# Patient Record
Sex: FEMALE | Race: BLACK OR AFRICAN AMERICAN
[De-identification: names, ages, dates, MRNs, and addresses within clinical notes are randomized per-mention and may not be internally consistent; named-entity substitution may affect disease eponyms.]

---

## 2019-02-04 ENCOUNTER — HOSPITAL ENCOUNTER (EMERGENCY)
Dept: HOSPITAL 74 - JERFT | Age: 29
Discharge: HOME | End: 2019-02-04
Payer: COMMERCIAL

## 2019-08-06 ENCOUNTER — HOSPITAL ENCOUNTER (INPATIENT)
Dept: HOSPITAL 74 - JDEL | Age: 29
LOS: 3 days | Discharge: HOME | DRG: 560 | End: 2019-08-09
Attending: OBSTETRICS & GYNECOLOGY | Admitting: OBSTETRICS & GYNECOLOGY
Payer: COMMERCIAL

## 2019-08-06 VITALS — BODY MASS INDEX: 56 KG/M2

## 2019-08-06 DIAGNOSIS — E66.01: ICD-10-CM

## 2019-08-06 DIAGNOSIS — Z3A.36: ICD-10-CM

## 2019-08-07 LAB
ALBUMIN SERPL-MCNC: 3.3 G/DL (ref 3.4–5)
ALP SERPL-CCNC: 147 U/L (ref 45–117)
ALT SERPL-CCNC: 16 U/L (ref 13–61)
AMPHET UR-MCNC: NEGATIVE NG/ML
ANION GAP SERPL CALC-SCNC: 13 MMOL/L (ref 8–16)
APPEARANCE UR: CLEAR
AST SERPL-CCNC: 14 U/L (ref 15–37)
BACTERIA # UR AUTO: 105.9 /HPF
BARBITURATES UR-MCNC: NEGATIVE NG/ML
BASOPHILS # BLD: 0.2 % (ref 0–2)
BENZODIAZ UR SCN-MCNC: NEGATIVE NG/ML
BILIRUB SERPL-MCNC: 0.4 MG/DL (ref 0.2–1)
BILIRUB UR STRIP.AUTO-MCNC: NEGATIVE MG/DL
BUN SERPL-MCNC: 5.7 MG/DL (ref 7–18)
CALCIUM SERPL-MCNC: 9 MG/DL (ref 8.5–10.1)
CASTS URNS QL MICRO: 77 /LPF (ref 0–8)
CHLORIDE SERPL-SCNC: 105 MMOL/L (ref 98–107)
CO2 SERPL-SCNC: 21 MMOL/L (ref 21–32)
COCAINE UR-MCNC: NEGATIVE NG/ML
COLOR UR: YELLOW
CREAT SERPL-MCNC: 0.6 MG/DL (ref 0.55–1.3)
DEPRECATED RDW RBC AUTO: 18 % (ref 11.6–15.6)
EOSINOPHIL # BLD: 0 % (ref 0–4.5)
EPITH CASTS URNS QL MICRO: 8.3 /HPF
GLUCOSE SERPL-MCNC: 122 MG/DL (ref 74–106)
HCT VFR BLD CALC: 34.1 % (ref 32.4–45.2)
HGB BLD-MCNC: 11.9 GM/DL (ref 10.7–15.3)
INR BLD: 1.03 (ref 0.83–1.09)
KETONES UR QL STRIP: (no result)
LEUKOCYTE ESTERASE UR QL STRIP.AUTO: (no result)
LYMPHOCYTES # BLD: 11.8 % (ref 8–40)
MCH RBC QN AUTO: 25.3 PG (ref 25.7–33.7)
MCHC RBC AUTO-ENTMCNC: 34.9 G/DL (ref 32–36)
MCV RBC: 72.4 FL (ref 80–96)
METHADONE UR-MCNC: NEGATIVE NG/ML
MONOCYTES # BLD AUTO: 4.2 % (ref 3.8–10.2)
NEUTROPHILS # BLD: 83.8 % (ref 42.8–82.8)
NITRITE UR QL STRIP: NEGATIVE
OPIATES UR QL SCN: NEGATIVE NG/ML
PCP UR QL SCN: NEGATIVE NG/ML
PH UR: 6.5 [PH] (ref 5–8)
PLATELET # BLD AUTO: 231 K/MM3 (ref 134–434)
PMV BLD: 7.6 FL (ref 7.5–11.1)
POTASSIUM SERPLBLD-SCNC: 3.7 MMOL/L (ref 3.5–5.1)
PROT SERPL-MCNC: 7 G/DL (ref 6.4–8.2)
PROT UR QL STRIP: (no result)
PROT UR QL STRIP: NEGATIVE
PT PNL PPP: 12.1 SEC (ref 9.7–13)
RBC # BLD AUTO: 18 /HPF (ref 0–4)
RBC # BLD AUTO: 4.71 M/MM3 (ref 3.6–5.2)
SODIUM SERPL-SCNC: 139 MMOL/L (ref 136–145)
SP GR UR: 1.03 (ref 1.01–1.03)
UROBILINOGEN UR STRIP-MCNC: 1 MG/DL (ref 0.2–1)
WBC # BLD AUTO: 10.4 K/MM3 (ref 4–10)
WBC # UR AUTO: 3 /HPF (ref 0–5)

## 2019-08-07 PROCEDURE — 0KQM0ZZ REPAIR PERINEUM MUSCLE, OPEN APPROACH: ICD-10-PCS | Performed by: OBSTETRICS & GYNECOLOGY

## 2019-08-07 RX ADMIN — FERROUS SULFATE TAB EC 324 MG (65 MG FE EQUIVALENT) SCH MG: 324 (65 FE) TABLET DELAYED RESPONSE at 09:57

## 2019-08-07 RX ADMIN — SODIUM CHLORIDE, SODIUM LACTATE, POTASSIUM CHLORIDE, CALCIUM CHLORIDE AND DEXTROSE MONOHYDRATE SCH MLS/HR: 5; 600; 310; 30; 20 INJECTION, SOLUTION INTRAVENOUS at 00:30

## 2019-08-07 RX ADMIN — FERROUS SULFATE TAB EC 324 MG (65 MG FE EQUIVALENT) SCH MG: 324 (65 FE) TABLET DELAYED RESPONSE at 21:05

## 2019-08-07 RX ADMIN — Medication SCH TAB: at 09:57

## 2019-08-07 RX ADMIN — Medication SCH MLS/HR: at 01:20

## 2019-08-07 NOTE — HP
Past Medical History





- Primary Care Physician


PCP:: Vemla Campbell





- Admission


Chief Complaint: 28 yrs , 36.4 weeks brought by ambulence in active 

labor.  onset LP  strong since 5.00 PM ? srom at the same time .  PNC at [facility]


History of Present Illness: 


pt started PNC with PMD in [place], transferred to [facility] in [place], , Transferred to 

[facility] due to High Risk Pregn due to High BMI 


pt was seen in L&D on 19 at noon evaluated for labor , 1cm dilated , no 

mention of membranes , ultrasound bpp done was sent home .


US report 19 : SLIUP VX 36.4 wks, STACI 9.8, post placenta, EFW 6'5" , BPP  


we attempted to acquire chart from [facility], No response obtained 


Pt had her visit record where labs were hand written  .


Late registrant in May x 3 visits 


3/10/19 pap NILM 


19 Prenatal panel : B Pos, HBS Ag - neg, Hep c -nr. Rpr -nr, Rubella -

immune , Hiv neg, Cf screen -neg, h/h 11.7/33.9, Gc/ct neg, Quantiferon -

indeterminate  


75 gm -2 hr GCT : fasting 58, , pp 125 .


as per pt she did not gain weight during pregnancy 





History Source: Patient





- Past Medical History


CNS: No: Migraine, Seizure


Cardiovascular: No: HTN, Murmur


Pulmonary: Yes: Other (h/o quantiferon -indeterminate  on 19).  No: Asthma

, Bronchitis


Gastrointestinal: Yes: Constipation.  No: GERD


Hepatobiliary: No: Hepatitis B, Hepatitis C


Renal/: No: UTI


...: 2


...Para: 0


...Induced : 1 (5 weeks ( 2016 ))


...LMP: 18


... Weeks Gestation by Dates: 36.4


...EDC by Dates: 19


...EDC by Sono: 19


Heme/Onc: Yes: Anemia


Infectious Disease: Yes: STD's (h/o herpes genitalis ? 2 yrs ago , pt declines 

any breakout during pregn , no meds taken during pregn).  No: AIDS, HIV


Psych: No: Addictions, Anxiety, Bipolar, Depression, Panic, Psychosis, 

Schizophrenia, Other


Endocrine: No: Diabetes Insipidus, Diabetes Mellitus





- Past Surgical History


Past Surgical History: Yes: None


Hx Myomectomy: No


Hx Transabdominal Cerclage: No





- Smoking History


Smoking history: Never smoked





- Alcohol/Substance Use


Hx Alcohol Use: No


History of Substance Use: reports: None, Marijuana (one time only one year ago)





Home Medications





- Allergies


Allergies/Adverse Reactions: 


 Allergies











Allergy/AdvReac Type Severity Reaction Status Date / Time


 


No Known Allergies Allergy   Verified 19 02:54














- Home Medications


Home Medications: 


Ambulatory Orders





Prenatal Vits96/Iron Fum/Folic [Prenatal Tablet] 1 each PO DAILY 19 











Physical Exam - Maternity


Constitutional: Yes: Severe Distress, Obese


Eyes: Yes: WNL


HENT: Yes: WNL


Neck: Yes: WNL


Cardiovascular: Yes: WNL


Lungs: Clear to auscultation


Breast(s): Yes: WNL, Other (large breast skin under breast fold hyperpigmented 

, as per pt secondary to bra)





- Abdominal Exam/OB


Fundal Height: 40


Number of Fetuses: Single


Fetal Presentation: Vertex


Contractions: Yes


Regularity: Regular (? 5 min , unable to pick on monitor, loss of contact, pt 

moving in bed)


Intensity: Strong


Fetal Monitor Mode: External


Fetal Heart Rate (range): 130-140


Fetal Heart Rate Location: Midline


Category: II


Accelerations: Uniform


Decelerations: Variable (recurrent variable decels noted after scalp electrode 

applied)





- Vaginal Exam/OB


Vaginal Bleediing: No


Speculum Exam: No


Dilatation (cm): 8cm 


Effacement (%): 100


Amniotic Membrane Status: Ruptured


Nitrazine Test: Positive


Amniotic Fluid: Yes: Clear


Fetal Presentation: Vertex/Position (exam at 12.20 AM , scalp electrode applied 

due to loss of contact)


Fetal Station: 0 (0/+1)





- Physical Exam


Musculoskeletal: Yes: WNL


Extremities: Yes: WNL.  No: Calf Tenderness


Edema: LLE: 1+, RLE: 1+


Integumentary: Yes: Other (dark hyperpigmented area under breast folds)


Deep Tendon Reflex Grade: Normal +2


...Motor Strength: WNL


Psychiatric: Yes: WNL, Alert, Oriented





- Labs


Lab Results: 


 CBC, BMP





 19 00:45 





 19 00:45 





 Laboratory Tests











  19





  00:45 00:45 00:45


 


PT with INR   12.10 


 


INR   1.03 


 


PTT (Actin FS)    28.0


 


AST  14 L  


 


ALT  16  


 


Ur Specific Gravity   


 


Urine Protein   


 


Urine Ketones   


 


Urine Blood   


 


Urine Nitrite   


 


Urine Bilirubin   


 


Urine Urobilinogen   


 


Ur Leukocyte Esterase   


 


Urine WBC (Auto)   


 


Urine RBC (Auto)   


 


Urine Casts (Auto)   


 


U Epithel Cells (Auto)   


 


Urine Bacteria (Auto)   


 


Opiates Screen   


 


Methadone Screen   


 


Barbiturate Screen   


 


Phencyclidine Screen   


 


Ur Amphetamines Screen   


 


MDMA (Ecstasy) Screen   


 


Benzodiazepines Screen   


 


Cocaine Screen   


 


U Marijuana (THC) Screen   


 


HIV 1&2 Antibody Screen   


 


HIV P24 Antigen   


 


Blood Type   














  19





  00:45 00:45 01:00


 


PT with INR   


 


INR   


 


PTT (Actin FS)   


 


AST   


 


ALT   


 


Ur Specific Gravity   


 


Urine Protein   


 


Urine Ketones   


 


Urine Blood   


 


Urine Nitrite   


 


Urine Bilirubin   


 


Urine Urobilinogen   


 


Ur Leukocyte Esterase   


 


Urine WBC (Auto)   


 


Urine RBC (Auto)   


 


Urine Casts (Auto)   


 


U Epithel Cells (Auto)   


 


Urine Bacteria (Auto)   


 


Opiates Screen    Negative


 


Methadone Screen    Negative


 


Barbiturate Screen    Negative


 


Phencyclidine Screen    Negative


 


Ur Amphetamines Screen    Negative


 


MDMA (Ecstasy) Screen    Negative


 


Benzodiazepines Screen    Negative


 


Cocaine Screen    Negative


 


U Marijuana (THC) Screen    Negative


 


HIV 1&2 Antibody Screen  Negative  


 


HIV P24 Antigen  Negative  


 


Blood Type   B POSITIVE 














  19





  01:00


 


PT with INR 


 


INR 


 


PTT (Actin FS) 


 


AST 


 


ALT 


 


Ur Specific Gravity  1.032


 


Urine Protein  Trace


 


Urine Ketones  2+ H


 


Urine Blood  2+ H


 


Urine Nitrite  Negative


 


Urine Bilirubin  Negative


 


Urine Urobilinogen  1.0


 


Ur Leukocyte Esterase  1+ H


 


Urine WBC (Auto)  3


 


Urine RBC (Auto)  18


 


Urine Casts (Auto)  77


 


U Epithel Cells (Auto)  8.3


 


Urine Bacteria (Auto)  105.9


 


Opiates Screen 


 


Methadone Screen 


 


Barbiturate Screen 


 


Phencyclidine Screen 


 


Ur Amphetamines Screen 


 


MDMA (Ecstasy) Screen 


 


Benzodiazepines Screen 


 


Cocaine Screen 


 


U Marijuana (THC) Screen 


 


HIV 1&2 Antibody Screen 


 


HIV P24 Antigen 


 


Blood Type 














Problem List





- Problems


(1) 36 to 37 weeks gestation of pregnancy


Code(s): RKF6720 -    





(2) Labor established


Code(s): DAI0588 -    





(3) Morbid (severe) obesity due to excess calories


Code(s): E66.01 - MORBID (SEVERE) OBESITY DUE TO EXCESS CALORIES   





(4) Prenatal care insufficient


Code(s): O09.30 - SUPRVSN OF PREG W INSUFFICIENT ANTENAT CARE, UNSP TRIMESTER   





(5) Pregnancy with prenatal care elsewhere, antepartum


Code(s): Z34.90 - ENCNTR FOR SUPRVSN OF NORMAL PREGNANCY, UNSP, UNSP TRIMESTER 

  





Assessment/Plan


28  yrs  36.4 weeks in active labor with SROM Gbs unknown ,morbidly 

obese 


Plan ; iv Ampicillin , prophylaxis  


         vaginal delivery trial 





Note .12.51 AM  pt pulled out scalp electrode 


        1.10AM  Fully dilated , Vx  +3 station , pushing ..

## 2019-08-07 NOTE — PN
Progress Note (short form)





- Note


Progress Note: 


ppd #0 


pt voided once after delivery 


she has not been oob yet since on pp side 


scd in situ 


ut is firm below umblicus 


lochia moserate 


 Selected Entries











  08/07/19 08/07/19





  00:30 04:26


 


Temperature 98.7 F 


 


Temperature  Oral





Source  


 


Pulse Rate 81 83


 


Blood Pressure 151/89 124/73


 


Weight 316 lb 








plan encourage ambulation 





Problem List





- Problems


(1) 36 to 37 weeks gestation of pregnancy


Code(s): BWS2783 -    





(2) Labor established


Code(s): LMB2616 -    





(3) Morbid (severe) obesity due to excess calories


Code(s): E66.01 - MORBID (SEVERE) OBESITY DUE TO EXCESS CALORIES   





(4) Prenatal care insufficient


Code(s): O09.30 - SUPRVSN OF PREG W INSUFFICIENT ANTENAT CARE, UNSP TRIMESTER   





(5) Pregnancy with prenatal care elsewhere, antepartum


Code(s): Z34.90 - ENCNTR FOR SUPRVSN OF NORMAL PREGNANCY, UNSP, UNSP TRIMESTER

## 2019-08-07 NOTE — PN
Delivery





- Delivery


Vaginal Delivery: No Problems ( , Vx presentation, [place] position, tight loop 

of cord around neck, clamped & cut , before delivery of shoulder. no difficulty 

encountered during delivery. baby required suction & positive pressure 

ventilation.cord blood gas & cord blood collected. Placenta removed completely 

with membranes . 2nd degree laceration was noted sutured with chr catgut #2/o 

under local anesthesia . blood clots removed from vagina & cx .  ml  Pr 

exam mucosa & sphincter intact), Spontaneous


EBL (cc): 350





Delivery, Single Birth





- Stages of Labor


Date 1st Stage Initiatied: 19


Time 1st Stage Initiated: 17:00


Date 2nd Stage Initiated: 19


Time 2nd Stage Initiated: 01:10


Date of Delivery: 19


Time of Delivery: 01:13


Date Placenta Delivered: 19


Time Placenta Delivered: 01:20


Placenta: Yes: Spontaneous





- Condition of Infant


Pediatrician/Neonatologist Present: No


Infant Gender: Male


Birth Weight: 6 lb 7 oz


Position: Left, OA (tight loop cord ariund neck x1)


Total Hours ROM (Hrs/Mins): 8hr 20 min





- Apgar


  ** 1 Minute


Apgar Total Score: 6





  ** 5 Minutes


Apgar Total Score: 8





Remarks





- Remarks


Remarks: 


28 yrs  , 36.4 weeks, insufficient pn care, else where Gbs unknown 


Intrapartum course uneventful


one dose of 2 gm IV Amicillin given

## 2019-08-08 LAB
BASOPHILS # BLD: 0.1 % (ref 0–2)
DEPRECATED RDW RBC AUTO: 18.6 % (ref 11.6–15.6)
EOSINOPHIL # BLD: 0.7 % (ref 0–4.5)
HCT VFR BLD CALC: 31.2 % (ref 32.4–45.2)
HGB BLD-MCNC: 11 GM/DL (ref 10.7–15.3)
LYMPHOCYTES # BLD: 25.9 % (ref 8–40)
MCH RBC QN AUTO: 25.7 PG (ref 25.7–33.7)
MCHC RBC AUTO-ENTMCNC: 35.5 G/DL (ref 32–36)
MCV RBC: 72.6 FL (ref 80–96)
MONOCYTES # BLD AUTO: 7 % (ref 3.8–10.2)
NEUTROPHILS # BLD: 66.3 % (ref 42.8–82.8)
PLATELET # BLD AUTO: 206 K/MM3 (ref 134–434)
PMV BLD: 7.3 FL (ref 7.5–11.1)
RBC # BLD AUTO: 4.29 M/MM3 (ref 3.6–5.2)
WBC # BLD AUTO: 8.5 K/MM3 (ref 4–10)

## 2019-08-08 RX ADMIN — FERROUS SULFATE TAB EC 324 MG (65 MG FE EQUIVALENT) SCH MG: 324 (65 FE) TABLET DELAYED RESPONSE at 22:33

## 2019-08-08 RX ADMIN — FERROUS SULFATE TAB EC 324 MG (65 MG FE EQUIVALENT) SCH MG: 324 (65 FE) TABLET DELAYED RESPONSE at 09:55

## 2019-08-08 RX ADMIN — Medication SCH TAB: at 09:55

## 2019-08-08 NOTE — PN
Post Partum Progress Note


Type of Delivery: 


Vital Signs: 


 Vital Signs











Temperature  98.2 F   19 21:10


 


Pulse Rate  71   19 21:10


 


Respiratory Rate  20   19 21:10


 


Blood Pressure  115/65   19 21:10


 


O2 Sat by Pulse Oximetry (%)      











Breast Exam: Yes: Other (deferred)


Uterus: Yes: Fundus Firm (abdomen obese)


Abdomen/GI: Yes: Abdomen soft


Lochia, amount: Moderate


Extremities: Yes: Calves non-tender


Activity: Ambulating





- Labs


Labs: 


 CBC











WBC  10.4 K/mm3 (4.0-10.0)  H  19  00:45    


 


RBC  4.71 M/mm3 (3.60-5.2)   19  00:45    


 


Hgb  11.9 GM/dL (10.7-15.3)   19  00:45    


 


Hct  34.1 % (32.4-45.2)   19  00:45    


 


MCV  72.4 fl (80-96)  L  19  00:45    


 


MCH  25.3 pg (25.7-33.7)  L  19  00:45    


 


MCHC  34.9 g/dl (32.0-36.0)   19  00:45    


 


RDW  18.0 % (11.6-15.6)  H  19  00:45    


 


Plt Count  231 K/MM3 (134-434)   19  00:45    


 


MPV  7.6 fl (7.5-11.1)   19  00:45    


 


Absolute Neuts (auto)  8.7 K/mm3 (1.5-8.0)  H  19  00:45    


 


Neutrophils %  83.8 % (42.8-82.8)  H  19  00:45    


 


Lymphocytes %  11.8 % (8-40)   19  00:45    


 


Monocytes %  4.2 % (3.8-10.2)   19  00:45    


 


Eosinophils %  0.0 % (0-4.5)   19  00:45    


 


Basophils %  0.2 % (0-2.0)   19  00:45    


 


Nucleated RBC %  0 % (0-0)   19  00:45    














Assessment/Plan





PPD # 1 in stable condition


-encourage ambulation


-continue PP care


-Encourage breast feeding

## 2019-08-09 VITALS — TEMPERATURE: 98.7 F | SYSTOLIC BLOOD PRESSURE: 125 MMHG | HEART RATE: 76 BPM | DIASTOLIC BLOOD PRESSURE: 78 MMHG

## 2019-08-09 RX ADMIN — SODIUM CHLORIDE, SODIUM LACTATE, POTASSIUM CHLORIDE, CALCIUM CHLORIDE AND DEXTROSE MONOHYDRATE SCH: 5; 600; 310; 30; 20 INJECTION, SOLUTION INTRAVENOUS at 17:01

## 2019-08-09 RX ADMIN — FERROUS SULFATE TAB EC 324 MG (65 MG FE EQUIVALENT) SCH MG: 324 (65 FE) TABLET DELAYED RESPONSE at 10:05

## 2019-08-09 RX ADMIN — SODIUM CHLORIDE, SODIUM LACTATE, POTASSIUM CHLORIDE, CALCIUM CHLORIDE AND DEXTROSE MONOHYDRATE SCH: 5; 600; 310; 30; 20 INJECTION, SOLUTION INTRAVENOUS at 17:02

## 2019-08-09 RX ADMIN — ACETAMINOPHEN PRN MG: 325 TABLET ORAL at 00:31

## 2019-08-09 RX ADMIN — ACETAMINOPHEN PRN MG: 325 TABLET ORAL at 16:04

## 2019-08-09 RX ADMIN — IBUPROFEN PRN MG: 600 TABLET, FILM COATED ORAL at 00:31

## 2019-08-09 RX ADMIN — Medication SCH TAB: at 10:05

## 2019-08-09 RX ADMIN — IBUPROFEN PRN MG: 600 TABLET, FILM COATED ORAL at 16:03

## 2019-08-09 RX ADMIN — Medication SCH: at 17:02

## 2019-08-09 NOTE — PN
Progress Note (short form)





- Note


Progress Note: 





ppd 2 ,no c/o, voids ok, no excess vaginal bleeding


 CBC, BMP





 08/08/19 07:06 





 08/07/19 00:45 





 Last Vital Signs











Temp Pulse Resp BP Pulse Ox


 


 98.0 F   83   20   123/80    


 


 08/08/19 21:15  08/08/19 21:15  08/08/19 21:15  08/08/19 21:15   





 uterus firm, non tender 


lochia mild 


no calf tenderness 


plan d/c home 


rtc 4 weeks 


instruction given

## 2019-08-14 NOTE — DS
Physical Exam-GYN


Vital Signs: 


 Vital Signs











Temperature  98.7 F   19 09:42


 


Pulse Rate  76   19 09:42


 


Respiratory Rate  20   19 09:42


 


Blood Pressure  125/78   19 09:42


 


O2 Sat by Pulse Oximetry (%)      











Constitutional: Yes: Well Nourished, Obese


Eyes: Yes: WNL


HENT: Yes: WNL


Neck: Yes: WNL


Cardiovascular: Yes: WNL


Respiratory: Yes: WNL


Gastrointestinal: Yes: WNL


....Post Partum: Yes: Uterus firm, Moderate lochia rubra (s/p perineum 2nd 

degree laceration repair healing)


Breast(s): Yes: WNL (not engorged . soft . BF)


Musculoskeletal: Yes: WNL


Extremities: Yes: WNL.  No: Calf Tenderness


Edema: LLE: 1+, RLE: 1+


Integumentary: Yes: WNL


Neurological: Yes: WNL


...Motor Strength: WNL


Psychiatric: Yes: WNL, Alert, Oriented


Labs: 


 CBC, BMP





 19 07:06 





 19 00:45 











Delivery





- Delivery


Vaginal Delivery: No Problems ( , Vx presentation, [place] position, tight loop 

of cord around neck, clamped & cut , before delivery of shoulder. no difficulty 

encountered during delivery. baby required suction & positive pressure 

ventilation.cord blood gas & cord blood collected. Placenta removed completely 

with membranes . 2nd degree laceration was noted sutured with chr catgut #2/o 

under local anesthesia . blood clots removed from vagina & cx .  ml  Pr 

exam mucosa & sphincter intact), Spontaneous


Type of Anesthesia: Local


Episiotomy/Laceration: None, Vaginal Extension/lac, 2nd degree


EBL (cc): 350





Delivery, Single Birth





- Stages of Labor


Date 1st Stage Initiatied: 19


Time 1st Stage Initiated: 17:00


Date 2nd Stage Initiated: 19


Time 2nd Stage Initiated: 01:10


Date of Delivery: 19


Time of Delivery: 01:13


Time Placenta Delivered: 01:20


Placenta: Yes: Spontaneous





- Condition of Infant


Pediatrician/Neonatologist Present: No


Infant Gender: Male


Birth Weight: 6 lb 7 oz


Position: Left, OA (tight loop cord ariund neck x1)


Total Hours ROM (Hrs/Mins): 8hr 20 min





- Apgar


  ** 1 Minute


Apgar Total Score: 6





  ** 5 Minutes


Apgar Total Score: 8





-  Feeding Plan


Initial Plan: Exclusive breastfeeding throughout hospitalization





Remarks





- Remarks


Remarks: 


28 yrs  , 36.4 weeks, insufficient pn care, else where Gbs unknown 


Intrapartum course uneventful


one dose of 2 gm IV Amicillin given 


pp course uneventful.


pt discharged by Dr Graham on 19











Discharge Summary


Reason For Visit: LABOR


Condition: Stable





- Instructions


Diet, Activity, Other Instructions: 


Post Partum Instructions





DIET:


Continue good diet high in protein, calcium, and iron rich foods. Drink at 

least eight (8) glasses of water daily in addition to other fluids.


___   Regular diet


_





MEDICATIONS:


Continue prenatal vitamins and iron as previously directed. Motrin and Tylenol 

may be taken for minor discomfort. 





ACTIVITY:


Mild to moderate exercise may be started in two (2) weeks. Take frequent rest 

periods. Resume normal activity after six (6) week check up. 





WOUND CARE OF OPERATIVE SITE:


Continue use of perineal bottle until vaginal discharge stops. Keep area clean. 

Shower daily. Keep abdominal wound dry. Report any drainage or redness to 

physician. Tub baths, tampons and douches are not permitted for 6 weeks.





ct  Breast feeding  & or  Bottle feeding





BREAST CARE: (For those that are not breastfeeding): If engorgement occurs:


Wear tight fitting bra.


Take Tylenol or Motrin for pain.


Apply cold packs (ice in bags to each breast )





FAMILY PLANNING:


There are many birth control alternatives to pursue and they should be 

discussed at your first office visit. You may resume sexual activity after your 

six (6) week check up. (Remember, breast feeding is not a contraceptive)





NEXT PHYSICIAN APPOINTMENT:


Be certain to call for a  four to six ( 4-6) week appointment, unless otherwise 

directed.  call [phone] at [address] [place] [address] 





Call Clinic or got to Emergency Dept if you have any of the following:


   Heavy vaginal bleeding


   Painful urination


   Leg pain


   Unusual odor noted to vaginal bleeding


   High fever


   Red streaking noted on breast








Referrals: 


Velma Campbell MD [Staff Physician] - 


Disposition: HOME





- Home Medications


Comprehensive Discharge Medication List: 


Ambulatory Orders





Prenatal Vits96/Iron Fum/Folic [Prenatal Tablet] 1 each PO DAILY 19 


Acetaminophen [Tylenol .Regular Strength -] 650 mg PO Q3H PRN  tablet 19 


Benzocaine [Americaine 20% Spray -] 1 spray TP PRN PRN  bottle 19 


Ferrous Sulfate [Feosol] 325 mg PO BID #60 tab 19 


Ibuprofen [Motrin -] 200 mg PO Q4H PRN  tablet 19 


Prenatal Vitamins (Sjr) - 1 tab PO DAILY #30 tablet 19

## 2019-08-19 NOTE — PATH
Surgical Pathology Report



Patient Name:  MATILDE NOVA

Accession #:  [id number]

Med. Rec. #:  [id number]                                                        

   /Age/Gender:  1990 (Age: 28) / F

Account:  [id number]                                                          

             Location: [facility] OBS/GYN

Taken:  2019

Received:  2019

Reported:  2019

Physicians:  Velma Campbell M.D.

  



Specimen(s) Received

 PLACENTA 





Clinical History

, 36.5 weeks, drop in patient-no prenatal care







Final Diagnosis

PLACENTA, DELIVERY:

409 G THIRD TRIMESTER PLACENTA WITH TRIVASCULAR UMBILICAL CORD AND UNREMARKABLE

PLACENTAL MEMBRANES.





***Electronically Signed***

Loren Thapa M.D.





Gross Description

The specimen is received fresh labeled placenta and is a 409 gram, 15.0 x 14.5 x

3.7 cm. placenta with attached membranes and umbilical cord.  The attached

membranes are tan, thick, cloudy and insert marginally.  The umbilical cord

measures 11 cm. in length and averages 0.9 cm. in diameter.  The cord inserts

eccentrically, 3 cm. to the nearest margin.  No true knots or strictures are

identified. Cut surface of the umbilical cord reveals 3 vessels. The fetal

surface is grey-blue with minimal fibrin deposition and appropriate caliber

vessels.  The maternal surface is red-brown with focal defects.  Sectioning

reveals red-brown, spongy parenchyma.  No lesions are identified. 

Representative sections are submitted in three cassettes as follows: 1- membrane

rolls and umbilical cord; 2-3- full thickness sections of placenta.

/8/15/2019



[facility]/8/15/2019